# Patient Record
(demographics unavailable — no encounter records)

---

## 2024-10-11 NOTE — HISTORY OF PRESENT ILLNESS
[N/A] : N/A [Denies] : Denies [Yes] : Yes [Informed consent documented in EHR.] : Informed consent documented in EHR. [Left upper extremity] : Left upper extremity [22g] : 22g [de-identified] : Assistive device, unsteady gait [IV discontinued. Intact. No signs or symptoms of IV complications noted. Time: ___] : IV discontinued. Intact. No signs or symptoms of IV complications noted. Time: [unfilled] [Patient  instructed to seek medical attention with signs and symptoms of adverse effects] : Patient  instructed to seek medical attention with signs and symptoms of adverse effects [Patient left unit in no acute distress] : Patient left unit in no acute distress [Medications administered as ordered and tolerated well.] : Medications administered as ordered and tolerated well. [de-identified] : Hand [de-identified] : 8465 [de-identified] : Generic: Immunoglobulin Billing unit 500mg Dx: History of peripheral neuropathy (Z86.69) Total Dose Administered: 50g                        Start: 1325                      Stop: 1631 Amount of drug waste: n/a Infusion Rate: 175mL/hr for entire infusion 1314  Pre-infusion - 140/88, 91, 17 1357  127/82, 82, 17 1428  128/80, 76, 18 1506  124/81, 78, 18 1540  131/82, 85, 17 1613  131/77, 87, 18 1631  145/93, 90, 18  90227, 74175 Buy and Bill: N                       [facility] Pre-Medication: N Was there a treatment reaction? n/a Action taken: n/a Next Appointment: 11/14/24 and 12/18/24 at 1000. Leena baig

## 2024-10-11 NOTE — HISTORY OF PRESENT ILLNESS
[N/A] : N/A [Denies] : Denies [Yes] : Yes [Informed consent documented in EHR.] : Informed consent documented in EHR. [Left upper extremity] : Left upper extremity [22g] : 22g [de-identified] : Assistive device, unsteady gait [IV discontinued. Intact. No signs or symptoms of IV complications noted. Time: ___] : IV discontinued. Intact. No signs or symptoms of IV complications noted. Time: [unfilled] [Patient  instructed to seek medical attention with signs and symptoms of adverse effects] : Patient  instructed to seek medical attention with signs and symptoms of adverse effects [Patient left unit in no acute distress] : Patient left unit in no acute distress [Medications administered as ordered and tolerated well.] : Medications administered as ordered and tolerated well. [de-identified] : Hand [de-identified] : 4147 [de-identified] : Generic: Immunoglobulin Billing unit 500mg Dx: History of peripheral neuropathy (Z86.69) Total Dose Administered: 50g                        Start: 1325                      Stop: 1631 Amount of drug waste: n/a Infusion Rate: 175mL/hr for entire infusion 1314  Pre-infusion - 140/88, 91, 17 1357  127/82, 82, 17 1428  128/80, 76, 18 1506  124/81, 78, 18 1540  131/82, 85, 17 1613  131/77, 87, 18 1631  145/93, 90, 18  93237, 72753 Buy and Bill: N                       [facility] Pre-Medication: N Was there a treatment reaction? n/a Action taken: n/a Next Appointment: 11/14/24 and 12/18/24 at 1000. Leena baig

## 2024-11-13 NOTE — ASSESSMENT
[FreeTextEntry1] : Mr. Hedrick 69 yo man who presents for the follow up of severe CANDE on CPAP. Compliance report from 10/14-11/12/24 demonstrated 100% usage (97% >/= 4 hours), average nightly usage 4b17mpy, residual AHI 0.3/hr, 95th percentile mask leak 10.3/hr. He is receiving benefit from PAP therapy and should continue to use it. 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 His residual daytime somnolence does not interfere with daytime functioning and improves with supplemental naps during the day, and he is not interested in pharmacologic therapy to improve daytime alertness.   Plan: - A new prescription for CPAP was sent today: Machine: Resmed airsense 11 Autoset Set pressure 10 cmH2O - Continue excellent compliance - Follow up in 1 year, earlier if needed

## 2024-11-13 NOTE — PHYSICAL EXAM
[General Appearance - Well Developed] : well developed [General Appearance - Well Nourished] : well nourished [Normal Conjunctiva] : the conjunctiva exhibited no abnormalities [Normal Oropharynx] : normal oropharynx [IV] : IV [Neck Appearance] : the appearance of the neck was normal [Heart Rate And Rhythm] : heart rate was normal and rhythm regular [Apical Impulse] : the apical impulse was normal [] : no respiratory distress [Respiration, Rhythm And Depth] : normal respiratory rhythm and effort [Bowel Sounds] : normal bowel sounds [Abdomen Soft] : soft [Nail Clubbing] : no clubbing of the fingernails [Cyanosis, Localized] : no localized cyanosis [Oriented To Time, Place, And Person] : oriented to person, place, and time

## 2024-11-13 NOTE — HISTORY OF PRESENT ILLNESS
[Obstructive Sleep Apnea] : obstructive sleep apnea [FreeTextEntry1] : Mr. Hedrick 69 yo man who presents for the follow up of severe CANDE on CPAP with residual daytime somnolence.  Comorbidities include nocturia, CIDP requiring q5 week IVIG infusions.  11/13/24- Reports comfort with the machine and mask interface- currently using an airtouch FFM and feels satisfied with his quality of sleep. Wakes up feeling refreshed. During the day he has sleepiness requiring 1-2 hour naps at noon and 2 PM. He feels refreshed after the naps. When he drives or does activities, he feels very alert and can stay awake.  Compliance report from 10/14-11/12/24 demonstrated 100% usage (97% >/= 4 hours), average nightly usage 6l67mpo, residual AHI 0.3/hr, 95th percentile mask leak 10.3/hr.   12/4/23-     He continues to use CPAP therapy on a nightly basis without complaints. He feels refreshed upon awakening in the morning but notes occasional unintentional sleep episodes in the afternoon otherwise is able to maintain wakefulness throughout the day and has good function. He is treated with a ResMed air sense 10 device. A download of therapy and adherence data from the device demonstrates usage of the device 100% of nights for an average of 7 hours and 34 minutes nightly with a CPAP pressure of 10 cm of water pressure. The therapy-based AHI 0.3/h. Mask leakage is in an acceptable range.  Sleep studies: 12/19/09- AHI 69.7, T90 37.2% 7/27/14- AHI normalized with CPAP- optimal pressure 10.4 cmH2O   Machine: Resmed airsense 10 Autoset Set update 6/19/19 Set pressure 10 cmH2O  [Snoring] : no snoring [Witnessed Apneas] : no witnessed sleep apnea [Frequent Nocturnal Awakening] : no nocturnal awakening [Unintentional Sleep while Active] : no unintentional sleep while active [Unintentional Sleep While Inactive] : no unintentional sleep while inactive [Awakes Unrefreshed] : does not awake unrefreshed [Awakes with Headache] : no headache upon awakening [Awakening With Dry Mouth] : no dry mouth upon awakening [Recent  Weight Gain] : no recent weight gain [Daytime Somnolence] : no daytime somnolence [DIS] : no DIS [DMS] : no DMS [Unusual Sleep Behavior] : no unusual sleep behavior [Hypersomnolence] : no hypersomnolence [Cataplexy] : no cataplexy [Sleep Paralysis] : no sleep paralysis [Hypnagogic Hallucinations] : no hallucinations when falling asleep [Hypnopompic Hallucinations] : no hallucinations when awakening [Lower Extremity Discomfort] : no lower extremity discomfort in evening or at bedtime

## 2024-11-14 NOTE — HISTORY OF PRESENT ILLNESS
[N/A] : N/A [Denies] : Denies [Yes] : Yes [de-identified] : Cane [Left upper extremity] : Left upper extremity [22g] : 22g [IV discontinued. Intact. No signs or symptoms of IV complications noted. Time: ___] : IV discontinued. Intact. No signs or symptoms of IV complications noted. Time: [unfilled] [Patient  instructed to seek medical attention with signs and symptoms of adverse effects] : Patient  instructed to seek medical attention with signs and symptoms of adverse effects [Patient left unit in no acute distress] : Patient left unit in no acute distress [Medications administered as ordered and tolerated well.] : Medications administered as ordered and tolerated well. [de-identified] : Hand [de-identified] : 9455 [de-identified] : Generic: Immunoglobulin Billing unit 500mg Dx: PMH: History of peripheral neuropathy (Z86.69) Total Dose Administered: 50g                         Start: 1020                      Stop: 1319 NDC# 2831-8017-53           Lot# [id number]    Exp: 7/31/27 NDC# 1946-9718-92           Lot# [id number]    Exp: 7/1/27 Amount of drug waste: n/a Infusion Rate: 175mL/hr for entire infusion 1008  157/92, 78, 18 1100  129/85, 75, 17 1126  131/88, 78, 17 1157  156/96, 70, 18 1230  141/90, 74, 18 1259  128/82, 74, 18 1319  136/84, 75, 17  04728, 04794 Buy and Bill: N                       [facility]  Was there a treatment reaction? n/a Action taken: n/a Next Appointment: 12/18

## 2024-12-18 NOTE — HISTORY OF PRESENT ILLNESS
[Yes] : Yes [Left upper extremity] : Left upper extremity [22g] : 22g [Start Time: ___] : Medication Start Time: [unfilled] [End Time: ___] : Medication End Time: [unfilled] [Medication Name: ___] : Medication Name: [unfilled] [Total Amount Administered: ___] : Total Amount Administered: [unfilled] [IV discontinued. Intact. No signs or symptoms of IV complications noted. Time: ___] : IV discontinued. Intact. No signs or symptoms of IV complications noted. Time: [unfilled] [Patient  instructed to seek medical attention with signs and symptoms of adverse effects] : Patient  instructed to seek medical attention with signs and symptoms of adverse effects [Patient left unit in no acute distress] : Patient left unit in no acute distress [Medications administered as ordered and tolerated well.] : Medications administered as ordered and tolerated well. [de-identified] : hand [de-identified] : 10:01 [de-identified] :       Generic: Immunoglobulin  Billing Unit 500mg  Dx: Peripheral neuropathy (G62.9)  Provider treatment prescriber: LIANG RAPHAEL	     Total Dose administered:   50g                             Start:  10:17             Stop: 13:20 Amount of drug waste: none  IV insertion time:    10:01                                          IV d/c time: 13:24   infusion Rate: 175  ml/hr   NDC#: 4782-2252-77      Lot#:  [id number]               Exp: 8/15/2027     (20g vial x1) NDC#: 3853-9450-63      Lot#: [id number]                Exp: 08/14/2027   (30g vial x1)     63338, 13924   Buy and bill: No                                        [facility]   VS prior to infusion @ 9:56- HR: 84 BP: 146/87 RR: 18 Infusion began @ 10;17 VS @ 10:48- HR: 81 BP: 124/73 RR: 16 VS @ 11:18- HR: 79 BP: 127/79 RR: 18 VS @ 11:53- HR: 78 BP: 125/83 RR: 18 VS @ 12:24- HR: 84 BP: 132/80 RR: 18 VS @ 13:00- HR: 84 BP: 137/83 RR: 16 VS @ 13:20- HR: 80 BP: 122/84 RR: 17  Was there a treatment reaction? no Action taken: n/a  Patient declined to stay for observation after infusion completed. He was informed that 30 mins of observation is recommended per our protocol. Patient was educated on risks vs benefits and verbalized an understanding of s/s of delayed infusion reaction.  Next Appointment: 1/23/25 @ 10:30

## 2025-01-08 NOTE — REVIEW OF SYSTEMS
[Leg Weakness] : leg weakness [Numbness] : numbness [Tingling] : tingling [Arthralgias] : arthralgias [As Noted in HPI] : as noted in HPI [Negative] : Heme/Lymph

## 2025-01-08 NOTE — DISCUSSION/SUMMARY
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  With respect to his back pain he is under the care of his physician with epidural steroids and should pursue further investigations.  He had obesity and has reduced severe inclusion body weight from originally being 365 pounds to 320 pounds and was encouraged to exercise lose weight and remain under the care of his diabetologist treating him with Mounjaro and be careful and remain under the care of his internist for general medical care.  Fall precautions were discussed and anticipating that in the next few months I might be retiring I advised him to see  our neuromuscular expert and appropriate appointment has been advised and in the interim he is free to call me for further advised and if necessary urgent follow-up evaluations and proper medical care with his family physicians metabolic/diabetic doctor back  Was advised meticulously and he expressed understanding.

## 2025-01-08 NOTE — PHYSICAL EXAM
[FreeTextEntry1] : Vital signs are recorded and unremarkable.  There is no carotid bruit, thyromegaly or lymphadenopathy.  Chest is clear heart sounds are normal abdomen is soft he has abdominal obesity.  Pedal pulsations are preserved there is no significant limb edema no meningeal signs and straight leg raising test is negative but there is mild paraspinal muscle spasm particularly on the right.  Neurologically memory language cognitive and behavioral functions are normal.  Optic disks are normal visual fields are full pupils are brisk there is no facial asymmetry hearing and bulbar functions are intact.  Motor tone is preserved with 5/5 strength in all proximal and distal muscle groups in both upper extremity with symmetric strength reflexes sensation and coordination and there is no abnormality in the upper extremities.  In the lower extremities he is motor strength is 5/5 with trace knee and absent ankle reflex there is no Babinski sign is right big toe is extensor weakness whereas there is no diffuse motor weakness he is position vibration pin perception is decreased distally in both feet worse on the right up to the knees his knee reflexes are trace are absentAnd there is no Babinski sign.  He is able to independently ambulate Romberg sign is negative.

## 2025-01-08 NOTE — DATA REVIEWED
[de-identified] : I reviewed the electrodiagnostic studies undertaken which revealed that he has sensorimotor nerves of predominantly very slow and nonreactive and the results were discussed with the patient of irreversibility of his immune mediated longstanding neuropathy. [de-identified] : I reviewed his medical records including his statements regarding he has been on Mounjaro with hemoglobin A1c of 5.8 and has been under the care of his physician with epidural steroid injection including weight loss and was encouraged to follow-up with his metabolic disorder physician and internist.  He was encouraged to continue IVIG therapy every 4 to 5 weeks and with the next 6 months if there is no significant improvement to discontinue the treatment at this is not likely to improve him.

## 2025-01-08 NOTE — HISTORY OF PRESENT ILLNESS
[FreeTextEntry1] : Mr. Landaverde a 68-year-old gentlemanWho has been under my care for peripheral neuropathy and his electrodiagnostic studies originally revealed that he has severe peripheral sensorimotor predominantly sensory neuropathy without known etiology and a lumbar puncture revealed that he has features of immune mediated chronic peripheral neuropathy and was treated with IVIG therapy and has been under the care ever since and remained neurologically stable.  He has been receiving IVIG therapy every 6 to 7 weeks and he is advised IVIG evaluation was performed on 50 mg Gammagard on 12/18/2024 and today stated that he has been getting it every 5 weeks.  I have advised him that the last electrodiagnostic studies have revealed that there is no definite improvement in his polyneuropathy of predominantly axonal type and most nerves were nonreactive and that it is conceivable that the changes are permanent and probably not reversible.  With respect to his IVIG it is maintenance therapy but he has the choice of stopping it since there is no definite improvement but he has elected to continue at this time.  There are no symptoms in the upper extremities.  Other major issue is low back pain for chronic and has been under epidural steroid injection by his physician and last evaluation was November 2024 which has caused a decrease in the lumbar paralumbar pain with radicular symptoms but there has not been any dramatic improvement and he prefers to continue with his physician with regard to his back pain.  The patient has history of diabetes and has been on Mounjaro with current hemoglobin A1c of 5.8 and he has lost his weight originally from 365 pounds to currently 310 to 315 pounds and feels better with respect to his obesity and has been under the care of his physician regarding his treatment for antidiabetic drug and Mounjaro.  He has been under the care of his internist Dr. Oakes and.  Review of systems is unremarkable he is retired and he denied any significant memory language cognitive or behavioral dysfunction denied any loss of vision diplopia dysarthria dysphagia or dyspnea and denied any neck pain.  There are no symptoms of tingling numbness weakness incoordination clumsiness or reflex alterations in the upper extremities.  There are no other systemic illnesses.  I reviewed his medications and allergies.

## 2025-01-24 NOTE — HISTORY OF PRESENT ILLNESS
[N/A] : N/A [Denies] : Denies [No] : No [Yes] : Yes [Declined] : Declined [Informed consent documented in EHR.] : Informed consent documented in EHR. [Left upper extremity] : Left upper extremity [24g] : 24g [Start Time: ___] : Medication Start Time: [unfilled] [End Time: ___] : Medication End Time: [unfilled] [Medication Name: ___] : Medication Name: [unfilled] [Total Amount Administered: ___] : Total Amount Administered: [unfilled] [IV discontinued. Intact. No signs or symptoms of IV complications noted. Time: ___] : IV discontinued. Intact. No signs or symptoms of IV complications noted. Time: [unfilled] [Patient  instructed to seek medical attention with signs and symptoms of adverse effects] : Patient  instructed to seek medical attention with signs and symptoms of adverse effects [Patient left unit in no acute distress] : Patient left unit in no acute distress [Medications administered as ordered and tolerated well.] : Medications administered as ordered and tolerated well. [de-identified] : 1103 [de-identified] : Generic: Immunoglobulin Billing unit 500mg Dx: PMH: History of peripheral neuropathy (Z86.69) Total Dose Administered: 50g Start: 1113 Stop: 1426 NDC# 1424-6322-35 Lot# [id number] Exp: 10/2027 NDC# 0226-3529-84 Lot# [id number] Exp: 08/2027 Amount of drug waste: n/a Infusion Rate: 175mL/hr for entire infusion 1027 116/75, 86, 18 1145 113/74, 82, 16 1216 121/77, 79, 16 1245 124/84, 79, 17 1316 129/84, 88, 16 1357 125/74, 90, 16 1426 118/72, 88, 17   32096, 22497 Buy and Bill: N [facility] Was there a treatment reaction? No Action taken: n/a Next Appointment:  Thursday, 3/6/25 @1030  Allergies No Known Drug Allergies

## 2025-03-06 NOTE — HISTORY OF PRESENT ILLNESS
[N/A] : N/A [Denies] : Denies [Yes] : Yes [Left upper extremity] : Left upper extremity [24g] : 24g [Patient  instructed to seek medical attention with signs and symptoms of adverse effects] : Patient  instructed to seek medical attention with signs and symptoms of adverse effects [Patient left unit in no acute distress] : Patient left unit in no acute distress [Medications administered as ordered and tolerated well.] : Medications administered as ordered and tolerated well. [de-identified] : Cane, unsteady gait [de-identified] : Hand  [de-identified] : 2845 [de-identified] : Generic: Immunoglobulin Billing unit 500mg Dx:  History of peripheral neuropathy (Z86.69) Total Dose Administered: 50g                        Start: 1150                       Stop: 1500 Amount of drug waste: n/a Infusion Rate: 175mL/hr 1139  101/66, 89, 17 1220  111/65, 87, 18 1251  112/69, 90, 18 1316  111/66, 84, 17 1400 109/67, 87, 17 1430  115/67, 87, 17 1507  120/76, 83, 17 NDC# 9726-5622-48                Lot# [id number]                Exp: 5/29/27 NDC# 3174-7287-47                Lot# [id number]                 Exp: 5/31/27  92671, 37564 Buy and Bill: N                       [facility] Pre-Medication: n/a Was there a treatment reaction? n/a Action taken: n/a Next Appointment: 4/24/25 at 1100. Dena constantinoed.

## 2025-04-16 NOTE — HISTORY OF PRESENT ILLNESS
[FreeTextEntry1] : cc: luts/ bph  LISBETH MATHEW is a 68 year male who presents with BPH/ LUTs, currently getting trigone injections for urgency/ frequency. He complains of urgency/ frequency for many years. Mr. MATHEW has been treated with finasteride/ tamsulosin/ mirageron with limited improvements.   IPSS 28/6 ebenezer pt deferred [Urinary Urgency] : urinary urgency [Urinary Frequency] : urinary frequency [Weak Stream] : weak stream

## 2025-04-16 NOTE — PHYSICAL EXAM
[General Appearance - Well Developed] : well developed [General Appearance - Well Nourished] : well nourished [Abdomen Soft] : soft Other, specify... [Skin Turgor] : supple [No Focal Deficits] : no focal deficits [Oriented To Time, Place, And Person] : oriented to person, place, and time [Not Anxious] : not anxious [de-identified] : pt deferred

## 2025-04-16 NOTE — ASSESSMENT
[FreeTextEntry1] : uroflow (BPH) - low volume pvr (BPH) - 0cc  1. BPH - failed tamsulosin, finasteride, mirabegron. Currently undergoing botox in trigone for urgency/ frequency. Work up w/ cysto/ trus/ uds.   2. PSA next visit.

## 2025-04-25 NOTE — HISTORY OF PRESENT ILLNESS
[N/A] : N/A [Denies] : Denies [Yes] : Yes [de-identified] : Cane, unsteady gait  [Left upper extremity] : Left upper extremity [24g] : 24g [IV discontinued. Intact. No signs or symptoms of IV complications noted. Time: ___] : IV discontinued. Intact. No signs or symptoms of IV complications noted. Time: [unfilled] [Patient  instructed to seek medical attention with signs and symptoms of adverse effects] : Patient  instructed to seek medical attention with signs and symptoms of adverse effects [Patient left unit in no acute distress] : Patient left unit in no acute distress [Medications administered as ordered and tolerated well.] : Medications administered as ordered and tolerated well. [de-identified] : Hand [de-identified] : 4509 [de-identified] : Generic: Immunoglobulin Billing unit 500mg Dx: PMH: History of peripheral neuropathy (Z86.69) Total Dose Administered: 50g                         Start: 1300                      Stop: 1632 Amount of drug waste: n/a Infusion Rate: 175mL/hr 1249  136/90, 98, 18 1336  120/75, 82, 18  1444  141/89, 78, 17 1517  131/78, 82, 17 1547  129/76, 89, 18 1635  132/87, 85, 20 NDC# 6819-7458-93                Lot# [id number]                Exp: 6/5/27 NDC# 0262-2209-57                Lot# [id number]                 Exp: 5/29/27  [id number], 76736 Buy and Bill: N                       [facility] Was there a treatment reaction? n/a Action taken: n/a Next Appointment: patient will call to make appt

## 2025-05-01 NOTE — PHYSICAL EXAM
[Person] : oriented to person [Place] : oriented to place [Time] : oriented to time [Fluency] : fluency intact [Cranial Nerves Optic (II)] : visual acuity intact bilaterally,  visual fields full to confrontation, pupils equal round and reactive to light [Cranial Nerves Oculomotor (III)] : extraocular motion intact [Cranial Nerves Trigeminal (V)] : facial sensation intact symmetrically [Cranial Nerves Facial (VII)] : face symmetrical [Cranial Nerves Vestibulocochlear (VIII)] : hearing was intact bilaterally [Cranial Nerves Glossopharyngeal (IX)] : tongue and palate midline [Cranial Nerves Accessory (XI - Cranial And Spinal)] : head turning and shoulder shrug symmetric [Cranial Nerves Hypoglossal (XII)] : there was no tongue deviation with protrusion [+4] : knee extension +4/5 [4] : ankle dorsiflexion 4/5 [5] : ankle plantar flexion 5/5 [2+] : Brachioradialis left 2+ [0] : Ankle jerk left 0 [FreeTextEntry8] : Uses walker, wide based

## 2025-05-01 NOTE — HISTORY OF PRESENT ILLNESS
[FreeTextEntry1] : This is a 68M patient of Dr. 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 Cortes retires.   The patient started developing numbness in the lower extremities in 2019. His symptoms progressed proximally and eventually reached his hips. He recalls at that time having bilateral finger numbness over the lateral two fingers. He was also diagnosed with carpal tunnel syndrome s/p right hand release. After workup as described above, he was started on IVIG. He feels that in the first year of treatment the numbness stopped progressing proximally, staying at the level of the hips, and his fingertip numbness resolved. After the first year of infusions he has no longer noticed any change in his symptoms and has stayed on IVIG 50g every 6 weeks.  Currently, the patient experiences numbness, no pain, of the legs and some mild tingling at night which doesn't interfere with his ability to sleep. He last met with Dr. Shane in January and they discussed possibly stopping IVIG.  The patient also has extensive bilateral hip arthritis for which he is having a left hip replacement on June 4th at [facility]. Plan is for eventual replacement of the right hip as well and possible lumbar spine intervention next year. As he will have a lapse in IVIG dosing around the surgery, the patient is wondering if we can monitor his neuropathy off IVIG before committing to restarting it again.

## 2025-05-01 NOTE — ASSESSMENT
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 Shane.  On review of the patient's records I am not convinced he has CIDP. His lower extremity studies show axonal neuropathy at the only area with demyelinating range results are the bilateral median nerves at the wrists, consistent with carpal tunnel syndrome. Mildly elevated protein in CSF is nonspecific and can be due lumbar disease. 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   I discussed my impression with the patient and we discussed at length. We decided that we will hold IVIG for now and monitor the patient moving forward, particularly post-operatively. If his numbness significantly worsens/becomes more dense and/or progresses proximally then we will re-start IVIG. If he remains relatively stable, will d/c IVIG.  - Hold IVIG  F/u in late July to evaluate